# Patient Record
Sex: FEMALE | Race: OTHER | Employment: FULL TIME | URBAN - NONMETROPOLITAN AREA
[De-identification: names, ages, dates, MRNs, and addresses within clinical notes are randomized per-mention and may not be internally consistent; named-entity substitution may affect disease eponyms.]

---
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
## 2023-01-03 ENCOUNTER — HOSPITAL ENCOUNTER (EMERGENCY)
Age: 18
Discharge: HOME | End: 2023-01-04
Attending: FAMILY MEDICINE
Payer: COMMERCIAL

## 2023-01-03 ENCOUNTER — APPOINTMENT (OUTPATIENT)
Dept: CT IMAGING | Age: 18
End: 2023-01-03
Payer: COMMERCIAL

## 2023-01-03 DIAGNOSIS — K29.00 ACUTE GASTRITIS, PRESENCE OF BLEEDING UNSPECIFIED, UNSPECIFIED GASTRITIS TYPE: Primary | ICD-10-CM

## 2023-01-03 LAB
BASOPHILS # BLD: 0.5 % (ref 0–3)
BASOPHILS ABSOLUTE: 0.1 THOU/MM3 (ref 0–0.1)
EOSINOPHILS ABSOLUTE: 0.1 THOU/MM3 (ref 0–0.5)
EOSINOPHILS RELATIVE PERCENT: 1 % (ref 0–4)
HCT VFR BLD CALC: 38.4 % (ref 37–47)
HEMOGLOBIN: 13 GM/DL (ref 12–16)
IMMATURE GRANS (ABS): 0.01 THOU/MM3 (ref 0–0.07)
IMMATURE GRANULOCYTES: 0 %
LYMPHOCYTES # BLD: 28.7 % (ref 15–47)
LYMPHOCYTES ABSOLUTE: 2.6 THOU/MM3 (ref 1–4.8)
MCH RBC QN AUTO: 31.9 PG (ref 26–32)
MCHC RBC AUTO-ENTMCNC: 33.9 GM/DL (ref 31–35)
MCV RBC AUTO: 94.3 FL (ref 81–99)
MONOCYTES: 0.8 THOU/MM3 (ref 0.3–1.3)
MONOCYTES: 8.4 % (ref 0–12)
PDW BLD-RTO: 12.1 % (ref 11.5–14.9)
PLATELET # BLD: 258 THOU/MM3 (ref 130–400)
PMV BLD AUTO: 10.1 FL (ref 9.4–12.4)
PREGNANCY, URINE: NEGATIVE
RBC # BLD: 4.07 MILL/MM3 (ref 4.1–5.3)
SEG NEUTROPHILS: 61.3 % (ref 43–75)
SEGMENTED NEUTROPHILS ABSOLUTE COUNT: 5.6 THOU/MM3 (ref 1.8–7.7)
WBC # BLD: 9.1 THOU/MM3 (ref 4.8–10.8)

## 2023-01-03 PROCEDURE — 81001 URINALYSIS AUTO W/SCOPE: CPT

## 2023-01-03 PROCEDURE — 74177 CT ABD & PELVIS W/CONTRAST: CPT

## 2023-01-03 PROCEDURE — 84703 CHORIONIC GONADOTROPIN ASSAY: CPT

## 2023-01-03 PROCEDURE — 85025 COMPLETE CBC W/AUTO DIFF WBC: CPT

## 2023-01-03 PROCEDURE — 99285 EMERGENCY DEPT VISIT HI MDM: CPT | Performed by: FAMILY MEDICINE

## 2023-01-03 ASSESSMENT — ENCOUNTER SYMPTOMS
SHORTNESS OF BREATH: 0
NAUSEA: 0
DIARRHEA: 0
VOMITING: 0
COLOR CHANGE: 0
SORE THROAT: 0
COUGH: 0
ABDOMINAL PAIN: 1

## 2023-01-03 ASSESSMENT — PAIN DESCRIPTION - ORIENTATION: ORIENTATION: MID

## 2023-01-03 ASSESSMENT — PAIN DESCRIPTION - DESCRIPTORS: DESCRIPTORS: CRAMPING;SHARP

## 2023-01-03 ASSESSMENT — PAIN SCALES - GENERAL: PAINLEVEL_OUTOF10: 8

## 2023-01-03 ASSESSMENT — PAIN - FUNCTIONAL ASSESSMENT: PAIN_FUNCTIONAL_ASSESSMENT: 0-10

## 2023-01-03 ASSESSMENT — PAIN DESCRIPTION - LOCATION: LOCATION: ABDOMEN

## 2023-01-04 VITALS
OXYGEN SATURATION: 97 % | HEIGHT: 62 IN | TEMPERATURE: 97.8 F | RESPIRATION RATE: 18 BRPM | SYSTOLIC BLOOD PRESSURE: 110 MMHG | HEART RATE: 89 BPM | BODY MASS INDEX: 23.92 KG/M2 | DIASTOLIC BLOOD PRESSURE: 65 MMHG | WEIGHT: 130 LBS

## 2023-01-04 LAB
ALBUMIN SERPL-MCNC: 4.1 GM/DL (ref 3.4–5)
ALP BLD-CCNC: 69 U/L (ref 46–116)
ALT SERPL-CCNC: 23 U/L (ref 14–63)
AMORPHOUS: ABNORMAL
ANION GAP: 11 MEQ/L (ref 8–16)
AST SERPL-CCNC: 13 U/L (ref 15–37)
BACTERIA: ABNORMAL
BILIRUB SERPL-MCNC: 0.2 MG/DL (ref 0.2–1)
BILIRUBIN URINE: NEGATIVE
BLOOD, URINE: ABNORMAL
BUN BLDV-MCNC: 15 MG/DL (ref 7–18)
CASTS UA: ABNORMAL /LPF
CHARACTER, URINE: CLEAR
CHLORIDE BLD-SCNC: 102 MEQ/L (ref 98–107)
CO2: 26 MEQ/L (ref 21–32)
COLOR: YELLOW
CREAT SERPL-MCNC: 0.8 MG/DL (ref 0.6–1.3)
CRYSTALS, UA: ABNORMAL
EPITHELIAL CELLS, UA: ABNORMAL /HPF
GFR, ESTIMATED: > 60 ML/MIN/1.73M2
GLUCOSE BLD-MCNC: 98 MG/DL (ref 74–106)
GLUCOSE, URINE: NEGATIVE MG/DL
KETONES, URINE: NEGATIVE
LEUKOCYTE ESTERASE, URINE: NEGATIVE
LIPASE: 114 U/L (ref 73–393)
MUCUS: ABNORMAL
NITRITE, URINE: NEGATIVE
PH UA: 6.5 (ref 5–9)
POC CALCIUM: 8.5 MG/DL (ref 8.5–10.1)
POTASSIUM SERPL-SCNC: 3.5 MEQ/L (ref 3.5–5.1)
PROTEIN UA: NEGATIVE MG/DL
RBC UA: ABNORMAL /HPF
REFLEX TO URINE C & S: ABNORMAL
SODIUM BLD-SCNC: 139 MEQ/L (ref 136–145)
SPECIFIC GRAVITY UA: 1.02 (ref 1–1.03)
TOTAL PROTEIN: 7.7 GM/DL (ref 6.4–8.2)
UROBILINOGEN, URINE: 2 EU/DL (ref 0–1)
WBC UA: ABNORMAL /HPF

## 2023-01-04 PROCEDURE — 83690 ASSAY OF LIPASE: CPT

## 2023-01-04 PROCEDURE — 6360000004 HC RX CONTRAST MEDICATION: Performed by: FAMILY MEDICINE

## 2023-01-04 PROCEDURE — 80053 COMPREHEN METABOLIC PANEL: CPT

## 2023-01-04 RX ORDER — OMEPRAZOLE 20 MG/1
20 CAPSULE, DELAYED RELEASE ORAL
Qty: 30 CAPSULE | Refills: 0 | Status: SHIPPED | OUTPATIENT
Start: 2023-01-04

## 2023-01-04 RX ORDER — ONDANSETRON 4 MG/1
4 TABLET, FILM COATED ORAL EVERY 8 HOURS PRN
Qty: 20 TABLET | Refills: 0 | Status: SHIPPED | OUTPATIENT
Start: 2023-01-04 | End: 2023-01-24

## 2023-01-04 RX ADMIN — IOPAMIDOL 100 ML: 755 INJECTION, SOLUTION INTRAVENOUS at 00:15

## 2023-01-04 NOTE — ED NOTES
Presents from home per self. C/o mid abdominal pain that began after an emesis on new years day. Pt states she has been bloated and nauseous as well. Last BM yesterday was normal. No trouble with urination. Unknown if pregnant. BS active x4. Pt states she has tenderness in mid epigastric region and some in lower abdomen. Triage exam room 5. Alert and oriented. Skin warm and dry. Color appropriate.       Peggy Saucedo RN  01/03/23 1221

## 2023-01-04 NOTE — ED NOTES
Pt updated on plan of care. Radiology notified pt is ready. Urine pregnancy is negative.      Ebenezer Harrison RN  01/03/23 4071

## 2023-01-04 NOTE — ED PROVIDER NOTES
[facility]  eMERGENCY dEPARTMENT eNCOUnter          CHIEF COMPLAINT     No chief complaint on file. Nurses Notes reviewed and I agree except as noted in the HPI. HISTORY OF PRESENT ILLNESS    Yesenia German is a 21 y.o. female who presents with abdominal pain. Onset of symptoms about 3 days ago. Symptoms have both mostly been in the epigastric area but it migrated to the periumbilical area. She notes pain moderate severe in intensity. Denies any nausea or vomiting. Denies any urinary symptoms. Denies any vaginal discharge. Denies any alleviating measures. Rates her current pain 8 out of 10. REVIEW OF SYSTEMS     Review of Systems   Constitutional:  Negative for chills and fever. HENT:  Negative for congestion and sore throat. Respiratory:  Negative for cough and shortness of breath. Cardiovascular:  Negative for chest pain and palpitations. Gastrointestinal:  Positive for abdominal pain. Negative for diarrhea, nausea and vomiting. Genitourinary:  Negative for dysuria, flank pain, frequency, vaginal bleeding and vaginal discharge. Skin:  Negative for color change and rash. All other systems reviewed and are negative. PAST MEDICAL HISTORY    has a past medical history of Asthma. SURGICAL HISTORY      has no past surgical history on file. CURRENT MEDICATIONS       Discharge Medication List as of 1/4/2023 12:56 AM        CONTINUE these medications which have NOT CHANGED    Details   Bismuth Subsalicylate (PEPTO-BISMOL PO) Take by mouthHistorical Med      Acetaminophen-Caff-Pyrilamine (MIDOL COMPLETE PO) Take by mouth Historical Med      potassium chloride (KLOR-CON M) 20 MEQ extended release tablet Take 1 tablet by mouth 2 times daily, Disp-10 tablet, R-0Normal      albuterol (PROVENTIL) (2.5 MG/3ML) 0.083% nebulizer solution Take 2.5 mg by nebulization every 6 hours as needed.       ibuprofen (ADVIL;MOTRIN) 100 MG/5ML suspension Take  by mouth every 4 hours as needed. ALLERGIES     has No Known Allergies. FAMILY HISTORY     has no family status information on file. family history is not on file. SOCIAL HISTORY      reports that she has never smoked. She has never used smokeless tobacco. She reports that she does not drink alcohol and does not use drugs. PHYSICAL EXAM     INITIAL VITALS:  height is 5' 2\" (1.575 m) and weight is 130 lb (59 kg). Her temperature is 97.8 °F (36.6 °C). Her blood pressure is 110/65 and her pulse is 89. Her respiration is 18 and oxygen saturation is 97%. Physical Exam  Vitals and nursing note reviewed. Eyes:      General: No scleral icterus. Conjunctiva/sclera: Conjunctivae normal.      Pupils: Pupils are equal, round, and reactive to light. Cardiovascular:      Rate and Rhythm: Regular rhythm. Tachycardia present. Abdominal:      General: Abdomen is flat. Tenderness: There is abdominal tenderness. There is no guarding or rebound. Comments: Positive finney's sign. No rebound. Pain noted in epigastric and periumbilical area. Skin:     General: Skin is dry. Findings: No erythema or rash. Neurological:      General: No focal deficit present. Mental Status: She is alert and oriented to person, place, and time. Psychiatric:         Mood and Affect: Mood normal.         Behavior: Behavior normal.        DIFFERENTIAL DIAGNOSIS:   Cholecystitis,cholelithiasis,appendicitis,ovarian cyst,ectopic pregnancy,     DIAGNOSTIC RESULTS     EKG: All EKG's are interpreted by the Emergency Department Physician who either signs or Co-signs this chart in the absence of a cardiologist.      RADIOLOGY: non-plain film images(s) such as CT, Ultrasound and MRI are read by the radiologist.  CT abdomen and pelvis with contrast       Indication: Epigastric pain. Technique: CT abdomen and pelvis with intravenous contrast. Coronal and    sagittal reformations.        Comparison: None       Findings: Partially imaged lung bases: No pleural effusion or focal consolidation. Abdomen/pelvis: The liver is normal. Gallbladder is normal in caliber. Normal caliber    hepatobiliary ducts. The spleen, pancreas, and adrenal glands are normal.       The kidneys enhance symmetrically. No hydronephrosis bilaterally. The large and small bowel is nondilated. Mild gastric wall thickening and    hyperenhancement of the mid and distal stomach, most compatible with    gastritis. Normal appendix. No bulky abdominal or retroperitoneal lymphadenopathy. The abdominal aorta is normal in caliber. Urinary bladder is underdistended. Anteverted and anteflexed uterus. Involuting right corpus luteal cyst 1.7    cm. Trace free pelvic fluid, likely physiologic. Bones: No suspicious osseous lesions. Impression   Impression:   Mild gastritis. This document has been electronically signed by: Spenser Cook MD on    01/04/2023 12:51 AM       All CTs at this facility use dose modulation techniques and iterative    reconstructions, and/or weight-based dosing   when appropriate to reduce radiation to a low as reasonably achievable.        LABS:   Labs Reviewed   CBC WITH AUTO DIFFERENTIAL - Abnormal; Notable for the following components:       Result Value    RBC 4.07 (*)     All other components within normal limits   COMPREHENSIVE METABOLIC PANEL - Abnormal; Notable for the following components:    AST 13 (*)     All other components within normal limits   URINALYSIS WITH REFLEX TO CULTURE - Abnormal; Notable for the following components:    Blood, Urine TRACE (*)     Urobilinogen, Urine 2.0 (*)     All other components within normal limits   LIPASE   PREGNANCY, URINE   GLOMERULAR FILTRATION RATE, ESTIMATED   ANION GAP       EMERGENCY DEPARTMENT COURSE:   Vitals:    Vitals:    01/03/23 2326 01/04/23 0103   BP: (!) 119/58 110/65   Pulse: (!) 105 89   Resp: 19 18   Temp: 97.8 °F (36.6 °C) SpO2: 97%    Weight: 130 lb (59 kg)    Height: 5' 2\" (1.575 m)      On exam the patient is nontoxic currently afebrile. I did have nursing in the room as a chaperone during her abdominal exam.  Patient is holding her epigastric area. She does seem to have a positive Bonilla sign. No rebound tenderness. Her areas of tenderness seem to be more in the epigastric and periumbilical area. Bowel sounds appear to be normal active. Labs will be obtained urine as well as urine pregnancy will be obtained. Based on her differential a CT of the abdomen with IV contrast will be performed. Ultrasound is not available at this facility at this time. Patient declined any pain medication at this time. CT abdomen show acute gastritis. No CT evidence of appendicitis. Urine pregnancy is negative. UA was clear. Will start patient on PPI and Zofran. Patient advised to follow up with PCP if symptoms not improving. CONSULTS:      PROCEDURES:  None    FINAL IMPRESSION      1. Acute gastritis, presence of bleeding unspecified, unspecified gastritis type          DISPOSITION/PLAN   Home. Care instructions provided. Follow up with PCP or ED as needed.      PATIENT REFERRED TO:  ISACC Tubbs - CNP  [address] [address]  [phone]    Schedule an appointment as soon as possible for a visit in 3 days      DISCHARGE MEDICATIONS:  Discharge Medication List as of 1/4/2023 12:56 AM        START taking these medications    Details   omeprazole (PRILOSEC) 20 MG delayed release capsule Take 1 capsule by mouth every morning (before breakfast), Disp-30 capsule, R-0Normal      ondansetron (ZOFRAN) 4 MG tablet Take 1 tablet by mouth every 8 hours as needed for Nausea, Disp-20 tablet, R-0Normal             (Please note that portions of this note were completed with a voice recognition program.  Efforts were made to edit the dictations but occasionally words are mis-transcribed.)    Charlee Baldwin MD Rayleen Goldberg, MD  01/04/23 5218

## 2023-09-28 ENCOUNTER — HOSPITAL ENCOUNTER (EMERGENCY)
Age: 18
Discharge: HOME | End: 2023-09-28
Attending: EMERGENCY MEDICINE
Payer: COMMERCIAL

## 2023-09-28 VITALS
DIASTOLIC BLOOD PRESSURE: 56 MMHG | HEIGHT: 62 IN | HEART RATE: 64 BPM | RESPIRATION RATE: 16 BRPM | OXYGEN SATURATION: 99 % | SYSTOLIC BLOOD PRESSURE: 90 MMHG | TEMPERATURE: 96.8 F | WEIGHT: 150 LBS | BODY MASS INDEX: 27.6 KG/M2

## 2023-09-28 DIAGNOSIS — R11.0 NAUSEA: ICD-10-CM

## 2023-09-28 DIAGNOSIS — R51.9 NONINTRACTABLE EPISODIC HEADACHE, UNSPECIFIED HEADACHE TYPE: Primary | ICD-10-CM

## 2023-09-28 LAB
ALBUMIN SERPL BCP-MCNC: 3.9 GM/DL (ref 3.4–5)
ALP SERPL-CCNC: 55 U/L (ref 46–116)
ALT SERPL W P-5'-P-CCNC: 22 U/L (ref 14–63)
ANION GAP SERPL CALC-SCNC: 8 MEQ/L (ref 8–16)
AST SERPL W P-5'-P-CCNC: 13 U/L (ref 15–37)
BASOPHILS # BLD: 0.3 % (ref 0–3)
BASOPHILS ABSOLUTE: 0 THOU/MM3 (ref 0–0.1)
BILIRUB SERPL-MCNC: 0.4 MG/DL (ref 0.2–1)
BUN SERPL-MCNC: 17 MG/DL (ref 7–18)
CALCIUM SERPL-MCNC: 9 MG/DL (ref 8.5–10.1)
CHLORIDE SERPL-SCNC: 105 MEQ/L (ref 98–107)
CO2 SERPL-SCNC: 27 MEQ/L (ref 21–32)
CREAT SERPL-MCNC: 0.9 MG/DL (ref 0.6–1.3)
EOSINOPHILS ABSOLUTE: 0 THOU/MM3 (ref 0–0.5)
EOSINOPHILS RELATIVE PERCENT: 0.3 % (ref 0–4)
GFR SERPL CREATININE-BSD FRML MDRD: > 60 ML/MIN/1.73M2
GLUCOSE SERPL-MCNC: 102 MG/DL (ref 74–106)
HCG UR QL: NEGATIVE
HCT VFR BLD CALC: 39.2 % (ref 37–47)
HEMOGLOBIN: 12.9 GM/DL (ref 12–16)
IMMATURE GRANS (ABS): 0.03 THOU/MM3 (ref 0–0.07)
IMMATURE GRANULOCYTES: 0 %
LIPASE SERPL-CCNC: 58 U/L (ref 73–393)
LYMPHOCYTES # BLD AUTO: 14.9 % (ref 15–47)
LYMPHOCYTES ABSOLUTE: 1.8 THOU/MM3 (ref 1–4.8)
MCH RBC QN AUTO: 31 PG (ref 26–32)
MCHC RBC AUTO-ENTMCNC: 32.9 GM/DL (ref 31–35)
MCV RBC AUTO: 94.2 FL (ref 81–99)
MONOCYTES: 0.6 THOU/MM3 (ref 0.3–1.3)
MONOCYTES: 4.9 % (ref 0–12)
MONONUCLEOSIS ANTIBODY: NEGATIVE
PDW BLD-RTO: 12 % (ref 11.5–14.9)
PLATELET # BLD AUTO: 274 THOU/MM3 (ref 130–400)
PMV BLD AUTO: 9.7 FL (ref 9.4–12.4)
POTASSIUM SERPL-SCNC: 4.3 MEQ/L (ref 3.5–5.1)
PROT SERPL-MCNC: 7.6 GM/DL (ref 6.4–8.2)
RBC # BLD: 4.16 MILL/MM3 (ref 4.1–5.3)
S PYO AG THROAT QL: NEGATIVE
SARS-COV-2 RDRP RESP QL NAA+PROBE: NOT  DETECTED
SEG NEUTROPHILS: 79.4 % (ref 43–75)
SEGMENTED NEUTROPHILS ABSOLUTE COUNT: 9.6 THOU/MM3 (ref 1.8–7.7)
SODIUM SERPL-SCNC: 140 MEQ/L (ref 136–145)
WBC # BLD: 12.1 THOU/MM3 (ref 4.8–10.8)

## 2023-09-28 PROCEDURE — 87635 SARS-COV-2 COVID-19 AMP PRB: CPT

## 2023-09-28 PROCEDURE — 96375 TX/PRO/DX INJ NEW DRUG ADDON: CPT

## 2023-09-28 PROCEDURE — 80053 COMPREHEN METABOLIC PANEL: CPT

## 2023-09-28 PROCEDURE — 85025 COMPLETE CBC W/AUTO DIFF WBC: CPT

## 2023-09-28 PROCEDURE — 6360000002 HC RX W HCPCS: Performed by: EMERGENCY MEDICINE

## 2023-09-28 PROCEDURE — 96374 THER/PROPH/DIAG INJ IV PUSH: CPT

## 2023-09-28 PROCEDURE — 83690 ASSAY OF LIPASE: CPT

## 2023-09-28 PROCEDURE — 2580000003 HC RX 258: Performed by: EMERGENCY MEDICINE

## 2023-09-28 PROCEDURE — 84703 CHORIONIC GONADOTROPIN ASSAY: CPT

## 2023-09-28 PROCEDURE — 87651 STREP A DNA AMP PROBE: CPT

## 2023-09-28 PROCEDURE — 99284 EMERGENCY DEPT VISIT MOD MDM: CPT

## 2023-09-28 PROCEDURE — 86308 HETEROPHILE ANTIBODY SCREEN: CPT

## 2023-09-28 RX ORDER — BUTALBITAL, ACETAMINOPHEN AND CAFFEINE 300; 40; 50 MG/1; MG/1; MG/1
1 CAPSULE ORAL EVERY 4 HOURS PRN
Qty: 15 CAPSULE | Refills: 0 | Status: SHIPPED | OUTPATIENT
Start: 2023-09-28

## 2023-09-28 RX ORDER — KETOROLAC TROMETHAMINE 30 MG/ML
15 INJECTION, SOLUTION INTRAMUSCULAR; INTRAVENOUS ONCE
Status: COMPLETED | OUTPATIENT
Start: 2023-09-28 | End: 2023-09-28

## 2023-09-28 RX ORDER — ONDANSETRON 2 MG/ML
4 INJECTION INTRAMUSCULAR; INTRAVENOUS ONCE
Status: COMPLETED | OUTPATIENT
Start: 2023-09-28 | End: 2023-09-28

## 2023-09-28 RX ORDER — 0.9 % SODIUM CHLORIDE 0.9 %
1000 INTRAVENOUS SOLUTION INTRAVENOUS ONCE
Status: COMPLETED | OUTPATIENT
Start: 2023-09-28 | End: 2023-09-28

## 2023-09-28 RX ORDER — ONDANSETRON 4 MG/1
4 TABLET, ORALLY DISINTEGRATING ORAL 3 TIMES DAILY PRN
Qty: 10 TABLET | Refills: 0 | Status: SHIPPED | OUTPATIENT
Start: 2023-09-28

## 2023-09-28 RX ADMIN — KETOROLAC TROMETHAMINE 15 MG: 30 INJECTION, SOLUTION INTRAMUSCULAR at 10:46

## 2023-09-28 RX ADMIN — ONDANSETRON 4 MG: 2 INJECTION INTRAMUSCULAR; INTRAVENOUS at 10:46

## 2023-09-28 RX ADMIN — SODIUM CHLORIDE 1000 ML: 9 INJECTION, SOLUTION INTRAVENOUS at 10:45

## 2023-09-28 ASSESSMENT — PAIN DESCRIPTION - LOCATION
LOCATION: HEAD
LOCATION: HEAD

## 2023-09-28 ASSESSMENT — PAIN DESCRIPTION - DESCRIPTORS: DESCRIPTORS: ACHING

## 2023-09-28 ASSESSMENT — PAIN SCALES - GENERAL
PAINLEVEL_OUTOF10: 2
PAINLEVEL_OUTOF10: 5

## 2023-09-28 NOTE — ED NOTES
AVS rev'd with pt. And copy given. Pulse regular. Extremities warm. Respirations regular and quiet. Mucous membranes pink & moist. Alert and oriented times 3. No nausea or vomiting. Range of motion within patient's limits. Skin pink, warm and dry. Calm and cooperative.       Nika Yeboah RN  09/28/23 7212

## 2023-09-28 NOTE — DISCHARGE INSTRUCTIONS
[place] diet at home. Stay in a quiet dark room. Avoid excessive stimulation from computers, cell phones etc.  Try Fioricet for your headache. Take Zofran for nausea. When you have recurrent episodic problems such as headaches take a diary of your food intake, how often your headaches are occurring, times a day and such to help find potential causes of your headaches.

## 2023-09-28 NOTE — ED PROVIDER NOTES
[address]  Phone: [phone] [facility]      Pt Name: Marlon Colon  MRN: [id number]  [address] [facility] 2005  Date of evaluation: 9/28/2023  Provider: Prince Knowles MD    CHIEF COMPLAINT       Chief Complaint   Patient presents with    Nausea    Headache         HISTORY OF PRESENT ILLNESS      Marlon Colon is a 19 y.o. female who presents to the emergency department with above noted complaint. Patient's been doing fine. Is developed some headaches and discomfort. Count of occipital may be on the crown as well as a little paracervical.  She associated some light sensitivity and some nausea. Denies other symptoms such as high fever chills cough although has had some fatigue of late. She relates that going on about 10 days although sound like to been actually sporadic over the last several months. Never been diagnosed with migraines. No family history of migraines. REVIEW OF SYSTEMS     Positive for headache. No fever or chills. Nausea but no vomiting  Review of Systems  All systems negative except as marked. PAST MEDICAL HISTORY     Past Medical History:   Diagnosis Date    Asthma          SURGICAL HISTORY       No past surgical history on file. CURRENT MEDICATIONS       Previous Medications    ACETAMINOPHEN-CAFF-PYRILAMINE (MIDOL COMPLETE PO)    Take by mouth     ALBUTEROL (PROVENTIL) (2.5 MG/3ML) 0.083% NEBULIZER SOLUTION    Take 2.5 mg by nebulization every 6 hours as needed. BISMUTH SUBSALICYLATE (PEPTO-BISMOL PO)    Take by mouth    IBUPROFEN (ADVIL;MOTRIN) 100 MG/5ML SUSPENSION    Take  by mouth every 4 hours as needed.     OMEPRAZOLE (PRILOSEC) 20 MG DELAYED RELEASE CAPSULE    Take 1 capsule by mouth every morning (before breakfast)    POTASSIUM CHLORIDE (KLOR-CON M) 20 MEQ EXTENDED RELEASE TABLET    Take 1 tablet by mouth 2 times daily       ALLERGIES       Patient has no known

## 2023-09-28 NOTE — ED TRIAGE NOTES
Pt. Presents ambulatory to ED with c/o headaches, nausea and vomiting for past 10 days. Pt.  Saw PCP Tuesday and was told to take motrin and if symptoms continued or got worse she should go to ER for further eval.

## 2023-11-01 ENCOUNTER — APPOINTMENT (OUTPATIENT)
Dept: GENERAL RADIOLOGY | Age: 18
End: 2023-11-01
Payer: COMMERCIAL

## 2023-11-01 ENCOUNTER — HOSPITAL ENCOUNTER (INPATIENT)
Age: 18
LOS: 3 days | Discharge: HOME | End: 2023-11-04
Attending: EMERGENCY MEDICINE
Payer: COMMERCIAL

## 2023-11-01 DIAGNOSIS — L03.818 CELLULITIS OF OTHER SPECIFIED SITE: Primary | ICD-10-CM

## 2023-11-01 DIAGNOSIS — M13.0 POLYARTHRITIS: ICD-10-CM

## 2023-11-01 PROBLEM — M25.40 JOINT SWELLING: Status: ACTIVE | Noted: 2023-11-01

## 2023-11-01 LAB
ALBUMIN SERPL BCP-MCNC: 3.5 GM/DL (ref 3.4–5)
ALP SERPL-CCNC: 75 U/L (ref 46–116)
ALT SERPL W P-5'-P-CCNC: 22 U/L (ref 14–63)
ANION GAP SERPL CALC-SCNC: 9 MEQ/L (ref 8–16)
AST SERPL W P-5'-P-CCNC: 10 U/L (ref 15–37)
BASOPHILS # BLD: 0.2 % (ref 0–3)
BASOPHILS ABSOLUTE: 0 THOU/MM3 (ref 0–0.1)
BILIRUB SERPL-MCNC: 0.5 MG/DL (ref 0.2–1)
BUN SERPL-MCNC: 11 MG/DL (ref 7–18)
CALCIUM SERPL-MCNC: 9.2 MG/DL (ref 8.5–10.1)
CHLORIDE SERPL-SCNC: 101 MEQ/L (ref 98–107)
CO2 SERPL-SCNC: 25 MEQ/L (ref 21–32)
CREAT SERPL-MCNC: 0.8 MG/DL (ref 0.6–1.3)
CRP SERPL-MCNC: 4.55 MG/DL (ref 0–1)
EOSINOPHILS ABSOLUTE: 0 THOU/MM3 (ref 0–0.5)
EOSINOPHILS RELATIVE PERCENT: 0.3 % (ref 0–4)
ERYTHROCYTE [SEDIMENTATION RATE] IN BLOOD BY WESTERGREN METHOD: 61 MM/HR (ref 0–20)
GFR SERPL CREATININE-BSD FRML MDRD: > 60 ML/MIN/1.73M2
GLUCOSE SERPL-MCNC: 101 MG/DL (ref 74–106)
HCT VFR BLD CALC: 36.4 % (ref 37–47)
HEMOGLOBIN: 12.3 GM/DL (ref 12–16)
IMMATURE GRANS (ABS): 0.04 THOU/MM3 (ref 0–0.07)
IMMATURE GRANULOCYTES: 0 %
LACTATE: 0.73 MMOL/L (ref 0.9–1.7)
LYMPHOCYTES # BLD AUTO: 12.1 % (ref 15–47)
LYMPHOCYTES ABSOLUTE: 1.7 THOU/MM3 (ref 1–4.8)
MCH RBC QN AUTO: 31.1 PG (ref 26–32)
MCHC RBC AUTO-ENTMCNC: 33.8 GM/DL (ref 31–35)
MCV RBC AUTO: 92.2 FL (ref 81–99)
MONOCYTES: 1 THOU/MM3 (ref 0.3–1.3)
MONOCYTES: 7.2 % (ref 0–12)
PDW BLD-RTO: 11.7 % (ref 11.5–14.9)
PLATELET # BLD AUTO: 339 THOU/MM3 (ref 130–400)
PMV BLD AUTO: 9.5 FL (ref 9.4–12.4)
POTASSIUM SERPL-SCNC: 3.6 MEQ/L (ref 3.5–5.1)
PROT SERPL-MCNC: 7.8 GM/DL (ref 6.4–8.2)
RBC # BLD: 3.95 MILL/MM3 (ref 4.1–5.3)
RHEUMATOID FACT SERPL-ACNC: 11 IU/ML (ref 0–13)
SEG NEUTROPHILS: 79.9 % (ref 43–75)
SEGMENTED NEUTROPHILS ABSOLUTE COUNT: 11.1 THOU/MM3 (ref 1.8–7.7)
SODIUM SERPL-SCNC: 135 MEQ/L (ref 136–145)
URATE SERPL-MCNC: 3.3 MG/DL (ref 2.4–5.7)
WBC # BLD: 13.9 THOU/MM3 (ref 4.8–10.8)

## 2023-11-01 PROCEDURE — 2580000003 HC RX 258: Performed by: EMERGENCY MEDICINE

## 2023-11-01 PROCEDURE — 85025 COMPLETE CBC W/AUTO DIFF WBC: CPT

## 2023-11-01 PROCEDURE — 86618 LYME DISEASE ANTIBODY: CPT

## 2023-11-01 PROCEDURE — 86140 C-REACTIVE PROTEIN: CPT

## 2023-11-01 PROCEDURE — 73110 X-RAY EXAM OF WRIST: CPT

## 2023-11-01 PROCEDURE — 36415 COLL VENOUS BLD VENIPUNCTURE: CPT

## 2023-11-01 PROCEDURE — 86430 RHEUMATOID FACTOR TEST QUAL: CPT

## 2023-11-01 PROCEDURE — 85651 RBC SED RATE NONAUTOMATED: CPT

## 2023-11-01 PROCEDURE — 1200000000 HC SEMI PRIVATE

## 2023-11-01 PROCEDURE — 73620 X-RAY EXAM OF FOOT: CPT

## 2023-11-01 PROCEDURE — 83605 ASSAY OF LACTIC ACID: CPT

## 2023-11-01 PROCEDURE — 99285 EMERGENCY DEPT VISIT HI MDM: CPT

## 2023-11-01 PROCEDURE — 73070 X-RAY EXAM OF ELBOW: CPT

## 2023-11-01 PROCEDURE — 84550 ASSAY OF BLOOD/URIC ACID: CPT

## 2023-11-01 PROCEDURE — 86617 LYME DISEASE ANTIBODY: CPT

## 2023-11-01 PROCEDURE — 86060 ANTISTREPTOLYSIN O TITER: CPT

## 2023-11-01 PROCEDURE — 87040 BLOOD CULTURE FOR BACTERIA: CPT

## 2023-11-01 PROCEDURE — 86038 ANTINUCLEAR ANTIBODIES: CPT

## 2023-11-01 PROCEDURE — 6360000002 HC RX W HCPCS: Performed by: EMERGENCY MEDICINE

## 2023-11-01 PROCEDURE — 80053 COMPREHEN METABOLIC PANEL: CPT

## 2023-11-01 RX ORDER — KETOROLAC TROMETHAMINE 30 MG/ML
30 INJECTION, SOLUTION INTRAMUSCULAR; INTRAVENOUS ONCE
Status: COMPLETED | OUTPATIENT
Start: 2023-11-01 | End: 2023-11-01

## 2023-11-01 RX ORDER — POTASSIUM CHLORIDE 7.45 MG/ML
10 INJECTION INTRAVENOUS PRN
Status: DISCONTINUED | OUTPATIENT
Start: 2023-11-01 | End: 2023-11-04 | Stop reason: HOSPADM

## 2023-11-01 RX ORDER — POTASSIUM CHLORIDE 20 MEQ/1
40 TABLET, EXTENDED RELEASE ORAL PRN
Status: DISCONTINUED | OUTPATIENT
Start: 2023-11-01 | End: 2023-11-04 | Stop reason: HOSPADM

## 2023-11-01 RX ORDER — ACETAMINOPHEN 650 MG/1
650 SUPPOSITORY RECTAL EVERY 6 HOURS PRN
Status: DISCONTINUED | OUTPATIENT
Start: 2023-11-01 | End: 2023-11-04 | Stop reason: HOSPADM

## 2023-11-01 RX ORDER — ACETAMINOPHEN 325 MG/1
650 TABLET ORAL EVERY 6 HOURS PRN
Status: DISCONTINUED | OUTPATIENT
Start: 2023-11-01 | End: 2023-11-04 | Stop reason: HOSPADM

## 2023-11-01 RX ORDER — ONDANSETRON 2 MG/ML
4 INJECTION INTRAMUSCULAR; INTRAVENOUS EVERY 6 HOURS PRN
Status: DISCONTINUED | OUTPATIENT
Start: 2023-11-01 | End: 2023-11-04 | Stop reason: HOSPADM

## 2023-11-01 RX ORDER — SODIUM CHLORIDE 0.9 % (FLUSH) 0.9 %
5-40 SYRINGE (ML) INJECTION EVERY 12 HOURS SCHEDULED
Status: DISCONTINUED | OUTPATIENT
Start: 2023-11-01 | End: 2023-11-04 | Stop reason: HOSPADM

## 2023-11-01 RX ORDER — ONDANSETRON 4 MG/1
4 TABLET, ORALLY DISINTEGRATING ORAL EVERY 8 HOURS PRN
Status: DISCONTINUED | OUTPATIENT
Start: 2023-11-01 | End: 2023-11-04 | Stop reason: HOSPADM

## 2023-11-01 RX ORDER — POLYETHYLENE GLYCOL 3350 17 G/17G
17 POWDER, FOR SOLUTION ORAL DAILY PRN
Status: DISCONTINUED | OUTPATIENT
Start: 2023-11-01 | End: 2023-11-04 | Stop reason: HOSPADM

## 2023-11-01 RX ORDER — MAGNESIUM SULFATE IN WATER 40 MG/ML
2000 INJECTION, SOLUTION INTRAVENOUS PRN
Status: DISCONTINUED | OUTPATIENT
Start: 2023-11-01 | End: 2023-11-04 | Stop reason: HOSPADM

## 2023-11-01 RX ORDER — SODIUM CHLORIDE 9 MG/ML
INJECTION, SOLUTION INTRAVENOUS PRN
Status: DISCONTINUED | OUTPATIENT
Start: 2023-11-01 | End: 2023-11-04 | Stop reason: HOSPADM

## 2023-11-01 RX ORDER — SODIUM CHLORIDE 0.9 % (FLUSH) 0.9 %
5-40 SYRINGE (ML) INJECTION PRN
Status: DISCONTINUED | OUTPATIENT
Start: 2023-11-01 | End: 2023-11-04 | Stop reason: HOSPADM

## 2023-11-01 RX ADMIN — KETOROLAC TROMETHAMINE 30 MG: 30 INJECTION, SOLUTION INTRAMUSCULAR; INTRAVENOUS at 19:34

## 2023-11-01 RX ADMIN — SODIUM CHLORIDE 3000 MG: 9 INJECTION, SOLUTION INTRAVENOUS at 19:20

## 2023-11-01 ASSESSMENT — PAIN DESCRIPTION - INTENSITY
RATING_3: 10
RATING_2: 5
RATING_3: 5
RATING_2: 10

## 2023-11-01 ASSESSMENT — PAIN SCALES - GENERAL
PAINLEVEL_OUTOF10: 8
PAINLEVEL_OUTOF10: 10
PAINLEVEL_OUTOF10: 5
PAINLEVEL_OUTOF10: 10
PAINLEVEL_OUTOF10: 10

## 2023-11-01 ASSESSMENT — PAIN DESCRIPTION - ORIENTATION
ORIENTATION_2: LEFT
ORIENTATION_3: RIGHT
ORIENTATION: RIGHT

## 2023-11-01 ASSESSMENT — PAIN - FUNCTIONAL ASSESSMENT
PAIN_FUNCTIONAL_ASSESSMENT: 0-10

## 2023-11-01 ASSESSMENT — PAIN DESCRIPTION - DESCRIPTORS
DESCRIPTORS: SHARP;PRESSURE;DISCOMFORT
DESCRIPTORS_3: DISCOMFORT;SHARP;PRESSURE
DESCRIPTORS: DISCOMFORT;TIGHTNESS;SHARP
DESCRIPTORS_2: DISCOMFORT;PRESSURE;SHARP

## 2023-11-01 ASSESSMENT — PAIN DESCRIPTION - LOCATION
LOCATION_2: WRIST
LOCATION: ARM;HAND
LOCATION: WRIST
LOCATION_3: FOOT

## 2023-11-01 ASSESSMENT — LIFESTYLE VARIABLES
HOW OFTEN DO YOU HAVE A DRINK CONTAINING ALCOHOL: MONTHLY OR LESS
HOW MANY STANDARD DRINKS CONTAINING ALCOHOL DO YOU HAVE ON A TYPICAL DAY: 1 OR 2
HOW OFTEN DO YOU HAVE A DRINK CONTAINING ALCOHOL: NEVER

## 2023-11-01 ASSESSMENT — PATIENT HEALTH QUESTIONNAIRE - PHQ9
SUM OF ALL RESPONSES TO PHQ QUESTIONS 1-9: 0
1. LITTLE INTEREST OR PLEASURE IN DOING THINGS: 0
SUM OF ALL RESPONSES TO PHQ QUESTIONS 1-9: 0

## 2023-11-01 NOTE — ED NOTES
Pt presents w/ c/o bilateral wrist swelling and right foot swelling. States that she drove to [place] 1 week ago (10/25/23), and noticed left wrist and right foot pain and swelling. She was seen in an ED while there and had normal x-rays. She noticed on Friday (10/27/23) that her right wrist was swelling. She returned home from [place] today and came here for evaluation. Right wrist is notably swollen and painful, and appears slightly red. Left wrist and right foot slightly swollen and painful. Respirations regular and easy. No distress noted.       Kevin Storm RN  11/01/23 [id number]

## 2023-11-01 NOTE — ED NOTES
Dr. Andres Penny consulting w/ [facility] [address] [facility] regarding pt. Admission discussed.       Edgar Mills RN  11/01/23 [id number]

## 2023-11-01 NOTE — ED PROVIDER NOTES
[address]  eMERGENCY dEPARTMENT eNCOUnter             [address] [facility] COMPLAINT    Chief Complaint   Patient presents with    Joint Swelling     Bilateral wrists (right > left). And right foot       Nurses Notes reviewed and I agree except as noted in the HPI. HPI    Raza Elizondo is a 19 y.o. female who presents, painful joints. A week ago, on 10/25/2023, while driving to [place], she noticed pain and swelling in the dorsum of her left wrist and the dorsum of the right foot. She was seen in an emergency department in [place], and had normal x-rays. She was sent home on symptomatic care. Since then, the pain and swelling have gotten worse  Over the last 3 days, she has had worsening swelling in the dorsum of her right wrist, with redness, and inability to completely dorsiflex the right wrist.  That is the most painful joint. Again no injury. She was not aware of any insect bites, has not been ill, was in an urban area, does not think she had any tick bites. Prior to a week ago, she had no signs of illness, and was enjoying good health. REVIEW OF SYSTEMS      Review of Systems   Constitutional:  Negative for diaphoresis, fever and malaise/fatigue. HENT:  Negative for congestion and sore throat. Respiratory:  Negative for cough and shortness of breath. Cardiovascular:  Negative for chest pain and palpitations. Gastrointestinal:  Negative for abdominal pain and nausea. Musculoskeletal:  Negative for falls. Skin:  Negative for rash. Neurological:  Negative for sensory change. Endo/Heme/Allergies:  Does not bruise/bleed easily. All other systems reviewed and are negative. PAST MEDICAL HISTORY     has a past medical history of Asthma. SURGICAL HISTORY     has no past surgical history on file. CURRENT MEDICATIONS    Current Discharge Medication List          ALLERGIES    has No Known Allergies.     FAMILY HISTORY    has no family

## 2023-11-02 ENCOUNTER — APPOINTMENT (OUTPATIENT)
Dept: INTERVENTIONAL RADIOLOGY/VASCULAR | Age: 18
End: 2023-11-02
Payer: COMMERCIAL

## 2023-11-02 PROBLEM — L03.90 CELLULITIS: Status: ACTIVE | Noted: 2023-11-02

## 2023-11-02 PROBLEM — A54.42 GONOCOCCAL ARTHRITIS (HCC): Status: ACTIVE | Noted: 2023-11-02

## 2023-11-02 PROBLEM — A54.86 DGI (DISSEMINATED GONOCOCCAL INFECTION) (HCC): Status: ACTIVE | Noted: 2023-11-02

## 2023-11-02 LAB
ANION GAP SERPL CALC-SCNC: 12 MEQ/L (ref 8–16)
ANTISTREPTOLYSIN-O: 114.9 IU/ML (ref 0–200)
BASOPHILS ABSOLUTE: 0.1 THOU/MM3 (ref 0–0.1)
BASOPHILS NFR BLD AUTO: 0.6 %
BILIRUB UR QL STRIP.AUTO: NEGATIVE
BUN SERPL-MCNC: 14 MG/DL (ref 7–22)
CALCIUM SERPL-MCNC: 9.2 MG/DL (ref 8.5–10.5)
CHARACTER SNV: CLEAR
CHARACTER UR: CLEAR
CHLAMYDIA TRACHOMATIS BY RT-PCR: NOT DETECTED
CHLORIDE SERPL-SCNC: 103 MEQ/L (ref 98–111)
CO2 SERPL-SCNC: 24 MEQ/L (ref 23–33)
COLOR SNV: COLORLESS
COLOR: YELLOW
CREAT SERPL-MCNC: 0.6 MG/DL (ref 0.4–1.2)
CRYSTALS FLD MICRO: NORMAL
CT/NG SOURCE: ABNORMAL
DEPRECATED RDW RBC AUTO: 40.6 FL (ref 35–45)
EOSINOPHIL NFR BLD AUTO: 0.8 %
EOSINOPHILS ABSOLUTE: 0.1 THOU/MM3 (ref 0–0.4)
ERYTHROCYTE [DISTWIDTH] IN BLOOD BY AUTOMATED COUNT: 11.9 % (ref 11.5–14.5)
GFR SERPL CREATININE-BSD FRML MDRD: > 60 ML/MIN/1.73M2
GLUCOSE SERPL-MCNC: 101 MG/DL (ref 70–108)
GLUCOSE UR QL STRIP.AUTO: NEGATIVE MG/DL
HCT VFR BLD AUTO: 36.7 % (ref 37–47)
HGB BLD-MCNC: 12.2 GM/DL (ref 12–16)
HGB UR QL STRIP.AUTO: NEGATIVE
IMM GRANULOCYTES # BLD AUTO: 0.07 THOU/MM3 (ref 0–0.07)
IMM GRANULOCYTES NFR BLD AUTO: 0.7 %
KETONES UR QL STRIP.AUTO: 15
LACTATE SERPL-SCNC: 1.4 MMOL/L (ref 0.5–2)
LYMPHOCYTES ABSOLUTE: 2 THOU/MM3 (ref 1–4.8)
LYMPHOCYTES NFR BLD AUTO: 19.6 %
MCH RBC QN AUTO: 30.8 PG (ref 26–33)
MCHC RBC AUTO-ENTMCNC: 33.2 GM/DL (ref 32.2–35.5)
MCV RBC AUTO: 92.7 FL (ref 81–99)
MONOCYTES ABSOLUTE: 1 THOU/MM3 (ref 0.4–1.3)
MONOCYTES NFR BLD AUTO: 9.6 %
MRSA DNA SPEC QL NAA+PROBE: NEGATIVE
NEISSERIA GONORRHOEAE BY RT-PCR: DETECTED
NEUTROPHILS NFR BLD AUTO: 68.7 %
NITRITE UR QL STRIP: NEGATIVE
NRBC BLD AUTO-RTO: 0 /100 WBC
NUC CELL # FLD AUTO: < 3 /CUMM (ref 0–150)
PATHOLOGIST REVIEW: NORMAL
PH UR STRIP.AUTO: 6.5 [PH] (ref 5–9)
PLATELET # BLD AUTO: 350 THOU/MM3 (ref 130–400)
PMV BLD AUTO: 9.8 FL (ref 9.4–12.4)
POTASSIUM SERPL-SCNC: 3.9 MEQ/L (ref 3.5–5.2)
PROT UR STRIP.AUTO-MCNC: NEGATIVE MG/DL
RBC # BLD AUTO: 3.96 MILL/MM3 (ref 4.2–5.4)
SEGMENTED NEUTROPHILS ABSOLUTE COUNT: 7.1 THOU/MM3 (ref 1.8–7.7)
SODIUM SERPL-SCNC: 139 MEQ/L (ref 135–145)
SP GR UR REFRACT.AUTO: 1.02 (ref 1–1.03)
TOTAL VOLUME RECEIVED SYNOVIAL: 0.2 ML
UROBILINOGEN, URINE: 1 EU/DL (ref 0–1)
WBC # BLD AUTO: 10.3 THOU/MM3 (ref 4.8–10.8)
WBC #/AREA URNS HPF: NEGATIVE /[HPF]

## 2023-11-02 PROCEDURE — 87070 CULTURE OTHR SPECIMN AEROBIC: CPT

## 2023-11-02 PROCEDURE — 87205 SMEAR GRAM STAIN: CPT

## 2023-11-02 PROCEDURE — 89050 BODY FLUID CELL COUNT: CPT

## 2023-11-02 PROCEDURE — 87491 CHLMYD TRACH DNA AMP PROBE: CPT

## 2023-11-02 PROCEDURE — 87075 CULTR BACTERIA EXCEPT BLOOD: CPT

## 2023-11-02 PROCEDURE — 20605 DRAIN/INJ JOINT/BURSA W/O US: CPT

## 2023-11-02 PROCEDURE — 85025 COMPLETE CBC W/AUTO DIFF WBC: CPT

## 2023-11-02 PROCEDURE — 87591 N.GONORRHOEAE DNA AMP PROB: CPT

## 2023-11-02 PROCEDURE — 1200000000 HC SEMI PRIVATE

## 2023-11-02 PROCEDURE — 6370000000 HC RX 637 (ALT 250 FOR IP)

## 2023-11-02 PROCEDURE — 2580000003 HC RX 258

## 2023-11-02 PROCEDURE — 81003 URINALYSIS AUTO W/O SCOPE: CPT

## 2023-11-02 PROCEDURE — 89060 EXAM SYNOVIAL FLUID CRYSTALS: CPT

## 2023-11-02 PROCEDURE — 80048 BASIC METABOLIC PNL TOTAL CA: CPT

## 2023-11-02 PROCEDURE — 86200 CCP ANTIBODY: CPT

## 2023-11-02 PROCEDURE — 2709999900 IR FLUORO GUIDED NEEDLE PLACEMENT

## 2023-11-02 PROCEDURE — 87641 MR-STAPH DNA AMP PROBE: CPT

## 2023-11-02 PROCEDURE — 0R9N3ZX DRAINAGE OF RIGHT WRIST JOINT, PERCUTANEOUS APPROACH, DIAGNOSTIC: ICD-10-PCS

## 2023-11-02 PROCEDURE — 36415 COLL VENOUS BLD VENIPUNCTURE: CPT

## 2023-11-02 PROCEDURE — 2580000003 HC RX 258: Performed by: INTERNAL MEDICINE

## 2023-11-02 PROCEDURE — 77002 NEEDLE LOCALIZATION BY XRAY: CPT

## 2023-11-02 PROCEDURE — 6360000002 HC RX W HCPCS: Performed by: INTERNAL MEDICINE

## 2023-11-02 PROCEDURE — 99223 1ST HOSP IP/OBS HIGH 75: CPT | Performed by: INTERNAL MEDICINE

## 2023-11-02 RX ADMIN — CEFTRIAXONE SODIUM 1000 MG: 1 INJECTION, POWDER, FOR SOLUTION INTRAMUSCULAR; INTRAVENOUS at 13:19

## 2023-11-02 RX ADMIN — ACETAMINOPHEN 650 MG: 325 TABLET ORAL at 21:58

## 2023-11-02 RX ADMIN — SODIUM CHLORIDE, PRESERVATIVE FREE 10 ML: 5 INJECTION INTRAVENOUS at 19:59

## 2023-11-02 RX ADMIN — ACETAMINOPHEN 650 MG: 325 TABLET ORAL at 12:03

## 2023-11-02 RX ADMIN — SODIUM CHLORIDE, PRESERVATIVE FREE 10 ML: 5 INJECTION INTRAVENOUS at 07:56

## 2023-11-02 ASSESSMENT — PAIN DESCRIPTION - LOCATION
LOCATION: ARM;LEG
LOCATION: ARM;LEG
LOCATION: HAND
LOCATION: ARM;LEG

## 2023-11-02 ASSESSMENT — ENCOUNTER SYMPTOMS
SHORTNESS OF BREATH: 0
COUGH: 0
SORE THROAT: 0
NAUSEA: 0
ABDOMINAL PAIN: 0

## 2023-11-02 ASSESSMENT — PAIN SCALES - GENERAL
PAINLEVEL_OUTOF10: 0
PAINLEVEL_OUTOF10: 2
PAINLEVEL_OUTOF10: 7
PAINLEVEL_OUTOF10: 8
PAINLEVEL_OUTOF10: 0

## 2023-11-02 ASSESSMENT — PAIN DESCRIPTION - DESCRIPTORS
DESCRIPTORS: ACHING
DESCRIPTORS: ACHING

## 2023-11-02 ASSESSMENT — PAIN DESCRIPTION - ORIENTATION
ORIENTATION: RIGHT

## 2023-11-02 ASSESSMENT — PAIN - FUNCTIONAL ASSESSMENT
PAIN_FUNCTIONAL_ASSESSMENT: PREVENTS OR INTERFERES SOME ACTIVE ACTIVITIES AND ADLS
PAIN_FUNCTIONAL_ASSESSMENT: PREVENTS OR INTERFERES SOME ACTIVE ACTIVITIES AND ADLS

## 2023-11-02 ASSESSMENT — PAIN DESCRIPTION - PAIN TYPE
TYPE: ACUTE PAIN
TYPE: ACUTE PAIN

## 2023-11-02 ASSESSMENT — PAIN DESCRIPTION - ONSET
ONSET: ON-GOING
ONSET: ON-GOING

## 2023-11-02 ASSESSMENT — PAIN DESCRIPTION - FREQUENCY
FREQUENCY: CONTINUOUS
FREQUENCY: CONTINUOUS

## 2023-11-02 NOTE — PLAN OF CARE
Problem: Discharge Planning  Goal: Discharge to home or other facility with appropriate resources  Outcome: Progressing     Problem: Pain  Goal: Verbalizes/displays adequate comfort level or baseline comfort level  Outcome: Progressing   Care plan reviewed with patient and family. Patient and family verbalize understanding of the plan of care and contribute to goal setting.

## 2023-11-02 NOTE — ED NOTES
Patient chose to go by private car. Paperwork signed. Observed patient resp easy, warm and dry. Patient encouraged to go directly to [facility], through ER. Room assigned 7k-21.  Observed patient leave with family     Erasto Martinez RN  11/01/23 2031 Lupus may be contributing to inflammatory process  -Rheum consult  -c/w home regimen of plaquenil, cellcept, prednisone  -Bactrim for PCP prophylaxis  -IV lasix to manage associated LE edema Lupus may be contributing to inflammatory process  -Rheum consult  -c/w home regimen of plaquenil, prednisone  -hold cellcept  -Bactrim for PCP prophylaxis  -IV lasix to manage associated LE edema, improving

## 2023-11-02 NOTE — CARE COORDINATION
Case Management Assessment  Initial Evaluation    Date/Time of Evaluation: 11/2/2023 1:54 PM  Assessment Completed by: Oren Xiao RN    If patient is discharged prior to next notation, then this note serves as note for discharge by case management. Patient Name: Elodia Perez                   YOB: 2005  Diagnosis: Joint swelling [M25.40]  Cellulitis of other specified site [V32.172]                   Date / Time: 11/1/2023  5:21 PM  Location: [facility]21/021     Patient Admission Status: Inpatient   Readmission Risk Low 0-14, Mod 15-19), High > 20: Readmission Risk Score: 5.4    Current PCP: ISACC Reddy CNP  PCP verified by CM? Yes    Chart Reviewed: Yes      History Provided by: Patient  Patient Orientation: Alert and Oriented    Patient Cognition: Alert    Hospitalization in the last 30 days (Readmission):  No    If yes, Readmission Assessment in CM Navigator will be completed. Advance Directives:      Code Status: Full Code   Patient's Primary Decision Maker is: Patient Declined (Legal Next of Kin Remains as Decision Maker)      Discharge Planning:    Patient lives with: Family Members Type of Home: House  Primary Care Giver: Self  Patient Support Systems include: Parent, Family Members   Current Financial resources: Medicaid  Current community resources: None  Current services prior to admission: None            Current DME:              Type of Home Care services:  None    ADLS  Prior functional level: Independent in ADLs/IADLs  Current functional level: Independent in ADLs/IADLs    Family can provide assistance at DC: Yes  Would you like Case Management to discuss the discharge plan with any other family members/significant others, and if so, who?  Yes (sister at bedside)  Plans to Return to Present Housing: Yes  Other Identified Issues/Barriers to RETURNING to current housing: none  Potential Assistance needed at discharge: N/A            Potential DME:    Patient expects to

## 2023-11-03 LAB
ANION GAP SERPL CALC-SCNC: 13 MEQ/L (ref 8–16)
BACTERIA BLD AEROBE CULT: NORMAL
BACTERIA BLD AEROBE CULT: NORMAL
BASOPHILS ABSOLUTE: 0 THOU/MM3 (ref 0–0.1)
BASOPHILS NFR BLD AUTO: 0.4 %
BUN SERPL-MCNC: 16 MG/DL (ref 7–22)
CALCIUM SERPL-MCNC: 9.1 MG/DL (ref 8.5–10.5)
CHLORIDE SERPL-SCNC: 107 MEQ/L (ref 98–111)
CO2 SERPL-SCNC: 22 MEQ/L (ref 23–33)
CREAT SERPL-MCNC: 0.8 MG/DL (ref 0.4–1.2)
DEPRECATED RDW RBC AUTO: 42 FL (ref 35–45)
EOSINOPHIL NFR BLD AUTO: 0.8 %
EOSINOPHILS ABSOLUTE: 0.1 THOU/MM3 (ref 0–0.4)
ERYTHROCYTE [DISTWIDTH] IN BLOOD BY AUTOMATED COUNT: 11.9 % (ref 11.5–14.5)
GFR SERPL CREATININE-BSD FRML MDRD: > 60 ML/MIN/1.73M2
GLUCOSE SERPL-MCNC: 94 MG/DL (ref 70–108)
HAV IGM SER QL: NEGATIVE
HBV CORE IGM SERPL QL IA: NEGATIVE
HBV SURFACE AG SERPL QL IA: NEGATIVE
HCT VFR BLD AUTO: 38.2 % (ref 37–47)
HCV IGG SERPL QL IA: NEGATIVE
HGB BLD-MCNC: 12.3 GM/DL (ref 12–16)
IMM GRANULOCYTES # BLD AUTO: 0.06 THOU/MM3 (ref 0–0.07)
IMM GRANULOCYTES NFR BLD AUTO: 0.6 %
LYMPHOCYTES ABSOLUTE: 2.9 THOU/MM3 (ref 1–4.8)
LYMPHOCYTES NFR BLD AUTO: 29.4 %
MCH RBC QN AUTO: 31.2 PG (ref 26–33)
MCHC RBC AUTO-ENTMCNC: 32.2 GM/DL (ref 32.2–35.5)
MCV RBC AUTO: 97 FL (ref 81–99)
MONOCYTES ABSOLUTE: 0.7 THOU/MM3 (ref 0.4–1.3)
MONOCYTES NFR BLD AUTO: 6.9 %
NEUTROPHILS NFR BLD AUTO: 61.9 %
NRBC BLD AUTO-RTO: 0 /100 WBC
PLATELET # BLD AUTO: 379 THOU/MM3 (ref 130–400)
PMV BLD AUTO: 9.8 FL (ref 9.4–12.4)
POTASSIUM SERPL-SCNC: 4.1 MEQ/L (ref 3.5–5.2)
RBC # BLD AUTO: 3.94 MILL/MM3 (ref 4.2–5.4)
SEGMENTED NEUTROPHILS ABSOLUTE COUNT: 6.1 THOU/MM3 (ref 1.8–7.7)
SODIUM SERPL-SCNC: 142 MEQ/L (ref 135–145)
WBC # BLD AUTO: 9.8 THOU/MM3 (ref 4.8–10.8)

## 2023-11-03 PROCEDURE — 6360000002 HC RX W HCPCS: Performed by: INTERNAL MEDICINE

## 2023-11-03 PROCEDURE — 80048 BASIC METABOLIC PNL TOTAL CA: CPT

## 2023-11-03 PROCEDURE — 2580000003 HC RX 258: Performed by: INTERNAL MEDICINE

## 2023-11-03 PROCEDURE — 36415 COLL VENOUS BLD VENIPUNCTURE: CPT

## 2023-11-03 PROCEDURE — 99232 SBSQ HOSP IP/OBS MODERATE 35: CPT | Performed by: INTERNAL MEDICINE

## 2023-11-03 PROCEDURE — 1200000000 HC SEMI PRIVATE

## 2023-11-03 PROCEDURE — 87389 HIV-1 AG W/HIV-1&-2 AB AG IA: CPT

## 2023-11-03 PROCEDURE — 85025 COMPLETE CBC W/AUTO DIFF WBC: CPT

## 2023-11-03 PROCEDURE — 80074 ACUTE HEPATITIS PANEL: CPT

## 2023-11-03 PROCEDURE — 2580000003 HC RX 258

## 2023-11-03 PROCEDURE — 86780 TREPONEMA PALLIDUM: CPT

## 2023-11-03 PROCEDURE — 6370000000 HC RX 637 (ALT 250 FOR IP)

## 2023-11-03 RX ADMIN — SODIUM CHLORIDE, PRESERVATIVE FREE 10 ML: 5 INJECTION INTRAVENOUS at 21:04

## 2023-11-03 RX ADMIN — SODIUM CHLORIDE, PRESERVATIVE FREE 10 ML: 5 INJECTION INTRAVENOUS at 08:34

## 2023-11-03 RX ADMIN — CEFTRIAXONE SODIUM 1000 MG: 1 INJECTION, POWDER, FOR SOLUTION INTRAMUSCULAR; INTRAVENOUS at 13:15

## 2023-11-03 RX ADMIN — ACETAMINOPHEN 650 MG: 325 TABLET ORAL at 23:52

## 2023-11-03 ASSESSMENT — PAIN DESCRIPTION - LOCATION
LOCATION: WRIST;HAND
LOCATION: HAND
LOCATION: WRIST;HAND

## 2023-11-03 ASSESSMENT — PAIN DESCRIPTION - ORIENTATION
ORIENTATION: RIGHT;LEFT

## 2023-11-03 ASSESSMENT — PAIN DESCRIPTION - ONSET
ONSET: ON-GOING
ONSET: ON-GOING

## 2023-11-03 ASSESSMENT — PAIN SCALES - GENERAL
PAINLEVEL_OUTOF10: 5
PAINLEVEL_OUTOF10: 7
PAINLEVEL_OUTOF10: 0
PAINLEVEL_OUTOF10: 0
PAINLEVEL_OUTOF10: 5

## 2023-11-03 ASSESSMENT — PAIN DESCRIPTION - FREQUENCY
FREQUENCY: CONTINUOUS
FREQUENCY: CONTINUOUS

## 2023-11-03 ASSESSMENT — PAIN DESCRIPTION - DESCRIPTORS
DESCRIPTORS: ACHING
DESCRIPTORS: ACHING

## 2023-11-03 ASSESSMENT — PAIN DESCRIPTION - PAIN TYPE
TYPE: ACUTE PAIN
TYPE: ACUTE PAIN

## 2023-11-03 NOTE — PLAN OF CARE
Problem: Discharge Planning  Goal: Discharge to home or other facility with appropriate resources  Outcome: Progressing  Flowsheets (Taken 11/3/2023 0114)  Discharge to home or other facility with appropriate resources:   Identify barriers to discharge with patient and caregiver   Arrange for needed discharge resources and transportation as appropriate   Identify discharge learning needs (meds, wound care, etc)   Refer to discharge planning if patient needs post-hospital services based on physician order or complex needs related to functional status, cognitive ability or social support system     Problem: Pain  Goal: Verbalizes/displays adequate comfort level or baseline comfort level  Outcome: Progressing  Flowsheets (Taken 11/3/2023 0114)  Verbalizes/displays adequate comfort level or baseline comfort level:   Encourage patient to monitor pain and request assistance   Assess pain using appropriate pain scale   Administer analgesics based on type and severity of pain and evaluate response   Implement non-pharmacological measures as appropriate and evaluate response   Consider cultural and social influences on pain and pain management   Notify Licensed Independent Practitioner if interventions unsuccessful or patient reports new pain   Care plan reviewed with patient. Patient verbalize understanding of the plan of care and contribute to goal setting.

## 2023-11-03 NOTE — CARE COORDINATION
11/3/23, 3:13 PM EDT    DISCHARGE ON GOING EVALUATION    [facility] day: 2  Location: -21/021-A Reason for admit: Joint swelling [M25.40]  Cellulitis of other specified site [L03.818]   Procedure:   11/2 XR Right Wrist: No acute fracture or dislocation. 2. Soft tissue swelling involving the dorsal medial soft tissues of the   hand and wrist. Finding is best identified on the lateral radiograph. Concern for soft tissue infection including cellulitis. 11/2 XR Left Wrist: No acute fracture or dislocation 2. Mild soft tissue swelling/edema overlying the dorsal medial aspect of   the left hand and proximal left wrist. Findings may indicate infectious process including cellulitis. 11/2 XR Right Elbow: No acute fracture or dislocation involving the right elbow. 2. The soft tissues are intact without adjacent soft tissue swelling. 11/2 XR Right Foot: Question mild soft tissue swelling involving the dorsal forefoot. Finding is nonspecific could be related to dependent edema or infectious   process/cellulitis. 2. No fracture or dislocation involving the right foot. 11/2 IR Arthrocentesis: for R. Wrist  Barriers to Discharge: IV Ceftriaxone daily. Await cultures. Hospitalist follows. PCP: ISACC Mckee CNP  Readmission Risk Score: 5.5%  Patient Goals/Plan/Treatment Preferences: plans home with her grandparents; hospitalist planning oral antibiotics. 11/3/23, 3:18 PM EDT    Patient goals/plan/ treatment preferences discussed by  and . Patient goals/plan/ treatment preferences reviewed with patient/ family. Patient/ family verbalize understanding of discharge plan and are in agreement with goal/plan/treatment preferences. Understanding was demonstrated using the teach back method.   AVS provided by RN at time of discharge, which includes all necessary medical information pertaining to the patients current course of illness, treatment, post-discharge goals of

## 2023-11-03 NOTE — PLAN OF CARE
Problem: Discharge Planning  Goal: Discharge to home or other facility with appropriate resources  Outcome: Completed  Flowsheets (Taken 11/3/2023 0114 by Lashay Christensen, ELROY)  Discharge to home or other facility with appropriate resources:   Identify barriers to discharge with patient and caregiver   Arrange for needed discharge resources and transportation as appropriate   Identify discharge learning needs (meds, wound care, etc)   Refer to discharge planning if patient needs post-hospital services based on physician order or complex needs related to functional status, cognitive ability or social support system     Problem: Pain  Goal: Verbalizes/displays adequate comfort level or baseline comfort level  Outcome: Completed  Flowsheets (Taken 11/3/2023 0114 by Lashay Christensen, ELROY)  Verbalizes/displays adequate comfort level or baseline comfort level:   Encourage patient to monitor pain and request assistance   Assess pain using appropriate pain scale   Administer analgesics based on type and severity of pain and evaluate response   Implement non-pharmacological measures as appropriate and evaluate response   Consider cultural and social influences on pain and pain management   Notify Licensed Independent Practitioner if interventions unsuccessful or patient reports new pain     Problem: Skin/Tissue Integrity - Adult  Goal: Skin integrity remains intact  Outcome: Completed  Flowsheets (Taken 11/3/2023 1727)  Skin Integrity Remains Intact:   Monitor for areas of redness and/or skin breakdown   Assess vascular access sites hourly     Problem: Musculoskeletal - Adult  Goal: Return mobility to safest level of function  Outcome: Completed  Flowsheets (Taken 11/3/2023 1727)  Return Mobility to Safest Level of Function: Instruct patient/family in ordered activity level     Problem: Gastrointestinal - Adult  Goal: Maintains adequate nutritional intake  Outcome: Completed  Flowsheets (Taken 11/3/2023 1727)  Maintains
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
## 2023-11-04 VITALS
TEMPERATURE: 98.4 F | HEIGHT: 62 IN | HEART RATE: 84 BPM | WEIGHT: 154.76 LBS | DIASTOLIC BLOOD PRESSURE: 57 MMHG | BODY MASS INDEX: 28.48 KG/M2 | SYSTOLIC BLOOD PRESSURE: 99 MMHG | RESPIRATION RATE: 16 BRPM | OXYGEN SATURATION: 99 %

## 2023-11-04 PROBLEM — M25.40 JOINT SWELLING: Status: RESOLVED | Noted: 2023-11-01 | Resolved: 2023-11-04

## 2023-11-04 PROBLEM — L03.90 CELLULITIS: Status: RESOLVED | Noted: 2023-11-02 | Resolved: 2023-11-04

## 2023-11-04 LAB
ANION GAP SERPL CALC-SCNC: 8 MEQ/L (ref 8–16)
B BURGDOR IGM SER QL IB: NEGATIVE
B BURGDOR.VLSE1+PEPC10 AB SER IA-ACNC: 1.22 IV
BACTERIA SPEC AEROBE CULT: NORMAL
BACTERIA SPEC ANAEROBE CULT: NORMAL
BASOPHILS ABSOLUTE: 0.1 THOU/MM3 (ref 0–0.1)
BASOPHILS NFR BLD AUTO: 0.6 %
BUN SERPL-MCNC: 12 MG/DL (ref 7–22)
CALCIUM SERPL-MCNC: 9 MG/DL (ref 8.5–10.5)
CHLORIDE SERPL-SCNC: 104 MEQ/L (ref 98–111)
CO2 SERPL-SCNC: 26 MEQ/L (ref 23–33)
CREAT SERPL-MCNC: 0.8 MG/DL (ref 0.4–1.2)
DEPRECATED RDW RBC AUTO: 40 FL (ref 35–45)
EOSINOPHIL NFR BLD AUTO: 1.1 %
EOSINOPHILS ABSOLUTE: 0.1 THOU/MM3 (ref 0–0.4)
ERYTHROCYTE [DISTWIDTH] IN BLOOD BY AUTOMATED COUNT: 11.8 % (ref 11.5–14.5)
GFR SERPL CREATININE-BSD FRML MDRD: > 60 ML/MIN/1.73M2
GLUCOSE SERPL-MCNC: 93 MG/DL (ref 70–108)
GRAM STN SPEC: NORMAL
HCT VFR BLD AUTO: 37 % (ref 37–47)
HGB BLD-MCNC: 12.2 GM/DL (ref 12–16)
HIV 1+2 AB+HIV1 P24 AG SERPL QL IA: NONREACTIVE
IMM GRANULOCYTES # BLD AUTO: 0.05 THOU/MM3 (ref 0–0.07)
IMM GRANULOCYTES NFR BLD AUTO: 0.6 %
LYMPHOCYTES ABSOLUTE: 2.6 THOU/MM3 (ref 1–4.8)
LYMPHOCYTES NFR BLD AUTO: 29.6 %
MCH RBC QN AUTO: 30.6 PG (ref 26–33)
MCHC RBC AUTO-ENTMCNC: 33 GM/DL (ref 32.2–35.5)
MCV RBC AUTO: 92.7 FL (ref 81–99)
MONOCYTES ABSOLUTE: 0.5 THOU/MM3 (ref 0.4–1.3)
MONOCYTES NFR BLD AUTO: 5.8 %
NEUTROPHILS NFR BLD AUTO: 62.3 %
NRBC BLD AUTO-RTO: 0 /100 WBC
NUCLEAR IGG SER QL IA: NORMAL
PLATELET # BLD AUTO: 417 THOU/MM3 (ref 130–400)
PMV BLD AUTO: 9.4 FL (ref 9.4–12.4)
POTASSIUM SERPL-SCNC: 3.8 MEQ/L (ref 3.5–5.2)
RBC # BLD AUTO: 3.99 MILL/MM3 (ref 4.2–5.4)
SEGMENTED NEUTROPHILS ABSOLUTE COUNT: 5.5 THOU/MM3 (ref 1.8–7.7)
SODIUM SERPL-SCNC: 138 MEQ/L (ref 135–145)
WBC # BLD AUTO: 8.8 THOU/MM3 (ref 4.8–10.8)

## 2023-11-04 PROCEDURE — 99239 HOSP IP/OBS DSCHRG MGMT >30: CPT | Performed by: INTERNAL MEDICINE

## 2023-11-04 PROCEDURE — 2580000003 HC RX 258: Performed by: INTERNAL MEDICINE

## 2023-11-04 PROCEDURE — 2580000003 HC RX 258

## 2023-11-04 PROCEDURE — 85025 COMPLETE CBC W/AUTO DIFF WBC: CPT

## 2023-11-04 PROCEDURE — 80048 BASIC METABOLIC PNL TOTAL CA: CPT

## 2023-11-04 PROCEDURE — 36415 COLL VENOUS BLD VENIPUNCTURE: CPT

## 2023-11-04 PROCEDURE — 6360000002 HC RX W HCPCS: Performed by: INTERNAL MEDICINE

## 2023-11-04 RX ORDER — CEFIXIME 400 MG/1
400 CAPSULE ORAL 2 TIMES DAILY
Qty: 8 CAPSULE | Refills: 0 | Status: SHIPPED | OUTPATIENT
Start: 2023-11-05 | End: 2023-11-09

## 2023-11-04 RX ORDER — DOXYCYCLINE HYCLATE 100 MG
100 TABLET ORAL 2 TIMES DAILY
Qty: 20 TABLET | Refills: 0 | Status: SHIPPED | OUTPATIENT
Start: 2023-11-04 | End: 2023-11-14

## 2023-11-04 RX ADMIN — SODIUM CHLORIDE, PRESERVATIVE FREE 10 ML: 5 INJECTION INTRAVENOUS at 10:18

## 2023-11-04 RX ADMIN — CEFTRIAXONE SODIUM 1000 MG: 1 INJECTION, POWDER, FOR SOLUTION INTRAMUSCULAR; INTRAVENOUS at 13:20

## 2023-11-04 NOTE — PLAN OF CARE
Problem: ABCDS Injury Assessment  Goal: Absence of physical injury  Outcome: Progressing  Flowsheets (Taken 11/4/2023 0032)  Absence of Physical Injury: Implement safety measures based on patient assessment     Problem: Skin/Tissue Integrity - Adult  Goal: Skin integrity remains intact  Outcome: Progressing  Flowsheets  Taken 11/4/2023 0032 by Kaylie LAM RN  Skin Integrity Remains Intact:   Monitor for areas of redness and/or skin breakdown   Assess vascular access sites hourly  Taken 11/3/2023 2309 by Haile Holder RN  Skin Integrity Remains Intact:   Monitor for areas of redness and/or skin breakdown   Assess vascular access sites hourly  Taken 11/3/2023 1727 by Nini Theodore RN  Skin Integrity Remains Intact:   Monitor for areas of redness and/or skin breakdown   Assess vascular access sites hourly     Problem: Musculoskeletal - Adult  Goal: Return mobility to safest level of function  Outcome: Progressing  Flowsheets  Taken 11/4/2023 0032 by Haile Holder RN  Return Mobility to Safest Level of Function:   Assess patient stability and activity tolerance for standing, transferring and ambulating with or without assistive devices   Assist with transfers and ambulation using safe patient handling equipment as needed   Ensure adequate protection for wounds/incisions during mobilization   Obtain physical therapy/occupational therapy consults as needed  Taken 11/3/2023 1727 by Nini Theodore RN  Return Mobility to Safest Level of Function: Instruct patient/family in ordered activity level     Problem: Gastrointestinal - Adult  Goal: Maintains or returns to baseline bowel function  Outcome: Progressing  Flowsheets (Taken 11/4/2023 0032)  Maintains or returns to baseline bowel function:   Assess bowel function   Encourage mobilization and activity   Encourage oral fluids to ensure adequate hydration   Administer ordered medications as needed     Problem: Genitourinary - Adult  Goal: Absence of

## 2023-11-04 NOTE — PLAN OF CARE
Problem: ABCDS Injury Assessment  Goal: Absence of physical injury  Outcome: Adequate for Discharge     Problem: Skin/Tissue Integrity - Adult  Goal: Skin integrity remains intact  Outcome: Adequate for Discharge     Problem: Musculoskeletal - Adult  Goal: Return mobility to safest level of function  Outcome: Adequate for Discharge     Problem: Gastrointestinal - Adult  Goal: Maintains or returns to baseline bowel function  Outcome: Adequate for Discharge     Problem: Genitourinary - Adult  Goal: Absence of urinary retention  Outcome: Adequate for Discharge     Problem: Infection - Adult  Goal: Absence of infection at discharge  Outcome: Adequate for Discharge

## 2023-11-04 NOTE — DISCHARGE SUMMARY
32.2 32.2 - 35.5 gm/dl    RDW-CV 11.9 11.5 - 14.5 %    RDW-SD 42.0 35.0 - 45.0 fL    Platelets 798 223 - 499 thou/mm3    MPV 9.8 9.4 - 12.4 fL    Seg Neutrophils 61.9 %    Lymphocytes 29.4 %    Monocytes 6.9 %    Eosinophils 0.8 %    Basophils 0.4 %    Immature Granulocytes 0.6 %    Segs Absolute 6.1 1.8 - 7.7 thou/mm3    Lymphocytes Absolute 2.9 1.0 - 4.8 thou/mm3    Monocytes Absolute 0.7 0.4 - 1.3 thou/mm3    Eosinophils Absolute 0.1 0.0 - 0.4 thou/mm3    Basophils Absolute 0.0 0.0 - 0.1 thou/mm3    Immature Grans (Abs) 0.06 0.00 - 0.07 thou/mm3    nRBC 0 /100 wbc   Basic Metabolic Panel    Collection Time: 11/03/23  5:40 AM   Result Value Ref Range    Sodium 142 135 - 145 meq/L    Potassium 4.1 3.5 - 5.2 meq/L    Chloride 107 98 - 111 meq/L    CO2 22 (L) 23 - 33 meq/L    Glucose 94 70 - 108 mg/dL    BUN 16 7 - 22 mg/dL    Creatinine 0.8 0.4 - 1.2 mg/dL    Calcium 9.1 8.5 - 10.5 mg/dL   Anion Gap    Collection Time: 11/03/23  5:40 AM   Result Value Ref Range    Anion Gap 13.0 8.0 - 16.0 meq/L   Glomerular Filtration Rate, Estimated    Collection Time: 11/03/23  5:40 AM   Result Value Ref Range    Est, Glom Filt Rate >60 >60 ml/min/1.73m2        Microbiology:    Blood culture #1:   Lab Results   Component Value Date/Time    BC No growth 24 hours. No growth 48 hours. 11/01/2023 07:16 PM       Blood culture #2:No results found for: \"BLOODCULT2\"    Organism:    Lab Results   Component Value Date/Time    LABGRAM  11/02/2023 11:20 AM     No segmented neutrophils observed. No bacteria seen.        MRSA culture only:No results found for: \"MRSAC\"    Urine culture: No results found for: \"LABURIN\"  No results found for: \"ORG\"     Respiratory culture: No results found for: \"CULTRESP\"    Aerobic and Anaerobic :  Lab Results   Component Value Date/Time    LABAERO No growth-preliminary 11/02/2023 11:20 AM     No results found for: \"LABANAE\"    Urinalysis:      Lab Results   Component Value Date/Time    NITRU NEGATIVE 11/02/2023 12:20 AM    WBCUA NONE 01/03/2023 11:52 PM    BACTERIA FEW 01/03/2023 11:52 PM    RBCUA 0-2 01/03/2023 11:52 PM    BLOODU NEGATIVE 11/02/2023 12:20 AM    SPECGRAV 1.025 01/03/2023 11:52 PM    GLUCOSEU NEGATIVE 11/02/2023 12:20 AM       Radiology:-  IR FLUORO GUIDED NEEDLE PLACEMENT    Result Date: 11/2/2023  PROCEDURE: IR FLUORO GUIDED NEEDLE PLACEMENT CLINICAL INFORMATION: 76-year-old female with pain and swelling of the right wrist and hand. No known injury Site aspirated: Right wrist FLUOROSCOPY TIME: 19 seconds FLUOROSCOPIC IMAGES: 1 Performed by: Dr. Justina Montano M.D. Procedure: Signed informed consent was obtained prior to performing this procedure. With the patient on the fluoroscopic table, the site of interest was evaluated fluoroscopically. The skin was marked, prepped, and draped in a sterile fashion. Following local anesthesia and utilizing aseptic technique, a 22-gauge spinal needle was successfully inserted into the joint as specified above. Several attempts were made to aspirate fluid which were unsuccessful. Approximately 1 mL sterile water was injected and a trace amount of fluid was then aspirated. This was sent for analysis. Impression: Status post right wrist joint aspiration. **This report has been created using voice recognition software. It may contain minor errors which are inherent in voice recognition technology. ** Final report electronically signed by Dr Justina Montano on 11/2/2023 1:01 PM    XR WRIST LEFT (MIN 3 VIEWS)    Result Date: 11/2/2023  4 view left wrist Comparison: None Findings: Mild soft tissue swelling/edema overlying the dorsal medial aspect of the left hand and proximal left wrist. Findings may indicate infectious process including cellulitis. Bones intact. No dislocations. No significant loss of joint space, osteophyte, or erosions. No radiopaque foreign body. 1. No acute fracture or dislocation 2.  Mild soft tissue swelling/edema overlying the dorsal medial

## 2023-11-05 LAB
CYCLIC CITRULLINATED PEPTIDE ANTIBODY IGG: 1.3 U/ML (ref 0–7)
T PALLIDUM IGG SER QL IF: NON REACTIVE

## 2023-11-06 LAB
BACTERIA BLD AEROBE CULT: NORMAL
BACTERIA BLD AEROBE CULT: NORMAL

## 2023-11-07 LAB
BACTERIA SPEC AEROBE CULT: NORMAL
BACTERIA SPEC ANAEROBE CULT: NORMAL
GRAM STN SPEC: NORMAL

## 2024-10-09 NOTE — ED NOTES
Dr Ortega Moore at bedside.      Ryann Martinez, ELROY  07/12/22 8826 no history of blood product transfusion